# Patient Record
Sex: MALE | Race: WHITE | NOT HISPANIC OR LATINO | Employment: FULL TIME | URBAN - METROPOLITAN AREA
[De-identification: names, ages, dates, MRNs, and addresses within clinical notes are randomized per-mention and may not be internally consistent; named-entity substitution may affect disease eponyms.]

---

## 2024-04-29 ENCOUNTER — OFFICE VISIT (OUTPATIENT)
Dept: URGENT CARE | Facility: CLINIC | Age: 24
End: 2024-04-29
Payer: COMMERCIAL

## 2024-04-29 VITALS
RESPIRATION RATE: 16 BRPM | HEART RATE: 63 BPM | DIASTOLIC BLOOD PRESSURE: 80 MMHG | OXYGEN SATURATION: 100 % | HEIGHT: 75 IN | TEMPERATURE: 98.1 F | WEIGHT: 200 LBS | SYSTOLIC BLOOD PRESSURE: 122 MMHG | BODY MASS INDEX: 24.87 KG/M2

## 2024-04-29 DIAGNOSIS — L03.032 PARONYCHIA OF GREAT TOE OF LEFT FOOT: ICD-10-CM

## 2024-04-29 RX ORDER — CEPHALEXIN 500 MG/1
500 CAPSULE ORAL 4 TIMES DAILY
Qty: 28 CAPSULE | Refills: 0 | Status: SHIPPED | OUTPATIENT
Start: 2024-04-29

## 2024-04-29 ASSESSMENT — ENCOUNTER SYMPTOMS
CONSTITUTIONAL NEGATIVE: 1
GASTROINTESTINAL NEGATIVE: 1
NEUROLOGICAL NEGATIVE: 1
RESPIRATORY NEGATIVE: 1
CARDIOVASCULAR NEGATIVE: 1

## 2024-04-30 NOTE — PROGRESS NOTES
"Subjective     Ralph Ramsey is a 24 y.o. male who presents with Toe Injury (Cracked his left big toe nail, inflammed x 2 months)            HPI  Left great toenail injury 2 months ago.  It was cracked and bruised.  However last few weeks he has noticed swelling, redness, tenderness of the nailbed.  The nail itself is loose.  There is no bony tenderness or systemic symptoms.  OTC meds unhelpful.      PMH:  has no past medical history on file.  MEDS:   Current Outpatient Medications:     cephALEXin (KEFLEX) 500 MG Cap, Take 1 Capsule by mouth 4 times a day., Disp: 28 Capsule, Rfl: 0  ALLERGIES: Not on File  SURGHX: No past surgical history on file.  SOCHX:    FH: family history is not on file.        Review of Systems   Constitutional: Negative.    Respiratory: Negative.     Cardiovascular: Negative.    Gastrointestinal: Negative.    Skin:         Toenail injury with infection   Neurological: Negative.        Medications, Allergies, and current problem list reviewed today in Epic           Objective     /80   Pulse 63   Temp 36.7 °C (98.1 °F) (Temporal)   Resp 16   Ht 1.905 m (6' 3\")   Wt 90.7 kg (200 lb)   SpO2 100%   BMI 25.00 kg/m²      Physical Exam  Vitals and nursing note reviewed.   Constitutional:       General: He is not in acute distress.     Appearance: Normal appearance. He is well-developed. He is not diaphoretic.   HENT:      Head: Normocephalic.      Right Ear: Hearing and external ear normal.      Left Ear: Hearing and external ear normal.      Nose: Nose normal.      Mouth/Throat:      Mouth: Mucous membranes are moist.   Eyes:      General:         Right eye: No discharge.         Left eye: No discharge.      Conjunctiva/sclera: Conjunctivae normal.   Cardiovascular:      Rate and Rhythm: Normal rate and regular rhythm.   Pulmonary:      Effort: Pulmonary effort is normal. No respiratory distress.      Breath sounds: Normal breath sounds.   Musculoskeletal:      Cervical back: Normal " range of motion and neck supple.   Skin:     General: Skin is warm and dry.      Comments: Nailbed infection of the left great toe with tenderness, edema, erythema and warmth.  No open lesions or discharge.  No red streaking or bony tenderness.  The left great toenail is injured with bruising at the distal nail.  The nailbed is anchored in place but does appear loose   Neurological:      General: No focal deficit present.      Mental Status: He is alert and oriented to person, place, and time. Mental status is at baseline.   Psychiatric:         Mood and Affect: Mood normal.         Behavior: Behavior normal.         Thought Content: Thought content normal.         Judgment: Judgment normal.                             Assessment & Plan     This is a very pleasant 24-year-old male presenting with a left great toenail injury 2 months ago.  The nail is cracked and bruised.  However over the last few weeks it has developed redness, tenderness, swelling and warmth.  No bony tenderness or systemic symptoms.  Nail is attached at the base but does appear loose.  We will treat the infection with Keflex and warm soaks.  Referral to podiatry for more long-term care. OTC meds and conservative measures as discussed        1. Paronychia of great toe of left foot  cephALEXin (KEFLEX) 500 MG Cap    Referral to Podiatry          I personally reviewed prior external notes and test results pertinent to today's visit. Return to clinic or go to ED if symptoms worsen or persist. Red flag symptoms and indications for ED discussed at length. Patient/Parent/Guardian voices understanding.  AVS with post-visit instructions printed and provided or given verbally.  Follow-up with your primary care provider in 3-5 days. All side effects and potential interactions of prescribed medication discussed including allergic response, GI upset, tendon injury, rash, sedation, OCP effectiveness, etc.    Please note that this dictation was created using  voice recognition software. I have made every reasonable attempt to correct obvious errors, but I expect that there are errors of grammar and possibly content that I did not discover before finalizing the note.

## 2024-05-20 ENCOUNTER — APPOINTMENT (OUTPATIENT)
Dept: RHEUMATOLOGY | Facility: MEDICAL CENTER | Age: 24
End: 2024-05-20
Attending: STUDENT IN AN ORGANIZED HEALTH CARE EDUCATION/TRAINING PROGRAM
Payer: COMMERCIAL

## 2024-06-04 ENCOUNTER — OFFICE VISIT (OUTPATIENT)
Dept: RHEUMATOLOGY | Facility: MEDICAL CENTER | Age: 24
End: 2024-06-04
Attending: STUDENT IN AN ORGANIZED HEALTH CARE EDUCATION/TRAINING PROGRAM
Payer: COMMERCIAL

## 2024-06-04 ENCOUNTER — HOSPITAL ENCOUNTER (OUTPATIENT)
Dept: RADIOLOGY | Facility: MEDICAL CENTER | Age: 24
End: 2024-06-04
Attending: STUDENT IN AN ORGANIZED HEALTH CARE EDUCATION/TRAINING PROGRAM
Payer: COMMERCIAL

## 2024-06-04 VITALS
OXYGEN SATURATION: 97 % | HEART RATE: 95 BPM | WEIGHT: 198 LBS | TEMPERATURE: 98 F | BODY MASS INDEX: 24.62 KG/M2 | SYSTOLIC BLOOD PRESSURE: 114 MMHG | DIASTOLIC BLOOD PRESSURE: 58 MMHG | HEIGHT: 75 IN

## 2024-06-04 DIAGNOSIS — D84.821 IMMUNOCOMPROMISED STATE DUE TO DRUG THERAPY (HCC): ICD-10-CM

## 2024-06-04 DIAGNOSIS — M79.673 PAIN OF FOOT, UNSPECIFIED LATERALITY: ICD-10-CM

## 2024-06-04 DIAGNOSIS — M12.80 HLA-B27 POSITIVE ARTHROPATHY: ICD-10-CM

## 2024-06-04 DIAGNOSIS — Z79.899 IMMUNOCOMPROMISED STATE DUE TO DRUG THERAPY (HCC): ICD-10-CM

## 2024-06-04 DIAGNOSIS — M45.8 ANKYLOSING SPONDYLITIS OF SACRAL REGION (HCC): Primary | ICD-10-CM

## 2024-06-04 PROCEDURE — 3074F SYST BP LT 130 MM HG: CPT | Performed by: STUDENT IN AN ORGANIZED HEALTH CARE EDUCATION/TRAINING PROGRAM

## 2024-06-04 PROCEDURE — 99204 OFFICE O/P NEW MOD 45 MIN: CPT | Performed by: STUDENT IN AN ORGANIZED HEALTH CARE EDUCATION/TRAINING PROGRAM

## 2024-06-04 PROCEDURE — 3078F DIAST BP <80 MM HG: CPT | Performed by: STUDENT IN AN ORGANIZED HEALTH CARE EDUCATION/TRAINING PROGRAM

## 2024-06-04 PROCEDURE — 99212 OFFICE O/P EST SF 10 MIN: CPT | Performed by: STUDENT IN AN ORGANIZED HEALTH CARE EDUCATION/TRAINING PROGRAM

## 2024-06-04 PROCEDURE — 77077 JOINT SURVEY SINGLE VIEW: CPT

## 2024-06-04 RX ORDER — AMOXICILLIN AND CLAVULANATE POTASSIUM 875; 125 MG/1; MG/1
TABLET, FILM COATED ORAL
COMMUNITY
Start: 2024-05-25

## 2024-06-04 RX ORDER — ADALIMUMAB 40MG/0.4ML
40 KIT SUBCUTANEOUS
Qty: 2 EACH | Refills: 5 | Status: SHIPPED | OUTPATIENT
Start: 2024-06-04 | End: 2024-06-09 | Stop reason: CLARIF

## 2024-06-04 NOTE — PROGRESS NOTES
[facility] RHEUMATOLOGY  [address], [place] [address]  Phone: [phone] ? Fax: [phone]  [facility]/Health-Services/Rheumatology    NEW PATIENT VISIT NOTE      DATE OF SERVICE: 06/04/2024         Subjective     REFERRING PRACTITIONER:  [facility]  [id number]  [place] [address]    PATIENT IDENTIFICATION:  Rlaph Ramsey  [address]  [address]  [place] [address]    YOB: 2000    MEDICAL RECORD NUMBER: [id number]         CHIEF COMPLAINT:   Chief Complaint   Patient presents with    New Patient     HLA B27 positive spondyloarthritis       HISTORY OF PRESENT ILLNESS:  Ralph Ramsey is a 24 y.o. male with pertinent history notable for HLA-B27 positive ankylosing spondylitis with sacroiliitis well-managed on adalimumab, who presents to [facility] care.     Ankylosing spondylitis was diagnosed at [facility] at the age of 20 in 2020. Previously under the care of rheumatologist Dr. Maria L Anton, last seen 9/2023. Initial presenting symptoms include lower back pain in the SIJ area with radicular symptoms. His low back pain was so severe that he was limping at one point and getting out of bed was challenging. He had nocturnal low back pain and rest was certainly exacerbating. Morning stiffness in the low back lasted for 1 hour, improved with activity. Prior to onset of inflammatory low back symptoms, had right knee effusion that lasted for 7-8 months and left ankle swelling that persisted for ~ 2 months. NSAIDs and intra-articular steroid injection were minimally effective. These peripheral joint symptoms resolved spontaneously. He had great improvement in axial symptoms within a few weeks of adalimumab injection and has had sustained relieve. Reports perhaps 6-7 mild flares of low back symptoms since 2020 that would last 3-4 days. Last injected about 1 month ago and need refills. He does not feel that the medication wears off too quickly.  Denies alternating buttock pain, enthesopathy along the Achilles, plantar fascia or elsewhere, dactylitis, abdominal pain with hematochezia/chronic diarrhea (no history of colonoscopies), or psoriasis. Reported one episode of psoriasiform rash that occurred on the right hand (along the thumb and index finger), this lasted ~ 5-6 months with spontaneous resolution. He is unsure if it had to do with dry weather here in [place] but did have the rash when he was in [place]. He saw an Ophthalmologist last year with no diagnosis of inflammatory eye disease and sees Optometry regularly. Denies episodes of red painful photophobic eyes. He remains quite active (hikes) despite a busy job as an  for [employer]. There is a family history of AS and he thinks some of his siblings also have AS (perhaps positive HLA-B27).     Of note, he developed infected toenail of the left 1st big toe at the end of March which preceded cracked nail that was not growing appropriately. Does not recall any particular trauma. He went to  and was prescribed cephalexin which helped somewhat but symptoms worsen with redness and tenderness so on second round of antibiotics with Augmentin which has helped with pain and swelling (has 1 week left). He was supposed to schedule an appointment with podiatry for further evaluation but did not get to it.    He is a non-smoker and drinks alcohol occasionally (1-2 drinks on weekends). Denies illicit drug use.     Reports other aspects of symptoms and medical history as noted on the questionnaire below or scanned under media tab.    Pertinent treatments:   Ibuprofen, x 4 months ineffective   Meloxicam, x 4 months ineffective   Prednisone, minimally effective  Adalimumab 40 mg SC q 14 days: 2020- present  Pertinent positive labs: HLA B27  Pertinent negative labs: 9/2023; CBC with differential, 11/2022; QuantiFERON gold  Pertinent imaging:   Will request imaging from previous rheumatologist at [facility].    REVIEW OF SYSTEMS:  Except as noted in the history above, relevant review of systems with emphasis on autoimmune rheumatic diseases was otherwise negative.    ACTIVE PROBLEM LIST:  HLA B27 positive  Ankylosing spondylitis  Sacroiliitis     PAST MEDICAL HISTORY:  HLA B27 positive  Ankylosing spondylitis  Sacroiliitis     PAST SURGICAL HISTORY: No surgeries    MEDICATIONS:  Current Outpatient Medications   Medication Sig    amoxicillin-clavulanate (AUGMENTIN) 875-125 MG Tab     Adalimumab (HUMIRA, 2 PEN,) 40 MG/0.4ML Pen-injector Kit Inject 0.4 mL under the skin every 14 days.    cephALEXin (KEFLEX) 500 MG Cap Take 1 Capsule by mouth 4 times a day. (Patient not taking: Reported on 6/4/2024)       ALLERGIES:   No Known Allergies    IMMUNIZATIONS:   Immunization History   Administered Date(s) Administered    PFIZER GRAY CAP SARS-COV-2 VACCINATION (12+) 04/17/2022       SOCIAL HISTORY:   Social History     Socioeconomic History    Marital status: Single       FAMILY HISTORY: See above         Objective     Vital Signs: /58 (BP Location: Left arm, Patient Position: Sitting, BP Cuff Size: Adult)   Pulse 95   Temp 36.7 °C (98 °F) (Temporal)   Ht 1.905 m (6' 3\")   Wt 89.8 kg (198 lb)   SpO2 97% Body mass index is 24.75 kg/m².    General: Appears well and comfortable  Eyes: No scleral or conjunctival lesions  ENT: No apparent oral or nasal lesions  Head/Neck: No apparent scalp or neck lesions  Cardiovascular: Regular rate and rhythm; no pericardial rubs  Respiratory: Breathing quiet and unlabored; no rales or pleural rubs  Gastrointestinal: No apparent organomegaly or abdominal masses  Integumentary:  No psoriasis  Musculoskeletal:   Mild tenderness and redness at 1st left MTP joint (primarily surrounding the toenail) otherwise no significant joint tenderness, periarticular soft tissue swelling, warmth, erythema, overt synovitis or significant restriction in range of motion of other joints " examined  Neurologic: No focal sensory or motor deficits  Psychiatric: Mood and affect appropriate    LABORATORY RESULTS REVIEWED AND INTERPRETED BY ME: See above    RADIOLOGY RESULTS REVIEWED AND INTERPRETED BY ME: See above    All relevant laboratory and imaging results reported on this note were reviewed and interpreted by me.         Assessment & Plan     Ralph Ramsey is a 24 y.o. male with history as noted above whose presentation merits the following clinical impressions and recommendations:    1. Ankylosing spondylitis of sacral region (HCC)  2. HLA-B27 positive arthropathy  Diagnosed with ankylosing spondylitis based on inflammatory low back pain, positive HLA B27 and sacroiliitis on pelvis MRI in 2020 at [facility]. He has primarily axial disease well managed on adalimumab which he has tolerated well without unwanted side effects. No history of inflammatory eye disease, psoriasis, IBD, dactylitis, or enthesopathy. NSAIDs were minimally effective prior to initiation of biologic therapy. Last injected adalimumab about 1 month ago and need refills. Currently without signs of active disease. Will request records of imaging and laboratory studies from previous rheumatologist.   - Adalimumab (HUMIRA, 2 PEN,) 40 MG/0.4ML Pen-injector Kit; Inject 0.4 mL under the skin every 14 days.  Dispense: 2 Each; Refill: 5  - CBC WITH DIFFERENTIAL; Future  - Comp Metabolic Panel; Future  - Quantiferon Gold Plus TB (4 tube); Future  - HEP C VIRUS ANTIBODY; Future  - HEP B SURFACE ANTIGEN; Future    3. Immunocompromised state due to drug therapy (HCC)  Presently with no history, physical, or laboratory evidence to suggest significant adverse drug effects or opportunistic infections, but need routine monitoring per guidelines.    4. Pain of foot, unspecified laterality  Paronychia of left 1st MTP joint toenail, currently on antibiotic therapy with improvement in swelling and pain.   - DX-JOINT  SURVEY-FEET SINGLE VIEW; Future  - Referral to Podiatry  - Referral to establish with PCP      The above assessment and plan were discussed with the patient who acknowledged understanding of the plan.    FOLLOW-UP: Return in about 6 months (around 12/4/2024).         Thank you for the opportunity to participate in the care of Ralph John Roxy.    Annie Chambers D.O.  Rheumatologist

## 2024-06-05 ENCOUNTER — TELEPHONE (OUTPATIENT)
Dept: RHEUMATOLOGY | Facility: MEDICAL CENTER | Age: 24
End: 2024-06-05
Payer: COMMERCIAL

## 2024-06-05 NOTE — PATIENT INSTRUCTIONS
Thank you for visiting our clinic today.     Summary of your visit:    Labs at your earliest convenience.     Follow up in 6 months.       AFTER VISIT INSTRUCTIONS    Below are important information to help you navigate your healthcare needs and help us serve you safely and effectively:  If laboratory tests and/or imaging studies were ordered, remember to go get them done as instructed.  If new prescriptions and/or refills were sent, remember to go pick them up from your local pharmacy, or call the specialty pharmacy to request shipment.  Always take your prescription medications exactly as prescribed unless instructed otherwise.  Note that antirheumatic drugs and steroids are immunosuppressive which means they increase your risk of infections and have multiple potential adverse effects on various organ systems in your body, though most of them are uncommon.  It is important that you are up-to-date on age-appropriate immunizations, particularly shingles and bacterial/viral pneumonia vaccines, which you can request from me or your primary care provider.  Be sure to read the drug package inserts to learn about the potential side effects of your medications before you start taking them.  If you experience any significant drug side effects, stop taking the medication and notify me promptly, and depending on the severity of the side effects, consider going to an urgent care or emergency department for immediate attention.  If there are significant findings on your lab tests and imaging studies that warrant further action, I will notify you with explanations via [employer] or phone call, otherwise you can view them on [employer] and let me know if you have any questions.  Note that [employer] messages are typically read during office hours and may take 1-7 business days before a response depending on the urgency of the situation and how busy my clinic schedule is.  In general, [employer] messaging is for non-urgent matters that do  not require immediate attention, so for urgent matters that cannot wait, you are advised to go to an urgent care.  Lastly, you are granted [employer] access to my documentation of your visit and are encouraged to read my note which details my assessment and plan for your condition.  To learn more about your condition and rheumatic diseases evaluated and treated by rheumatologists, as well as gain access to many helpful resources about these diseases, visit our website at [email or web address].

## 2024-06-05 NOTE — TELEPHONE ENCOUNTER
I went to submit a prior authorization for Humira and one of the questions provides a list or preferred products. Would you like to switch to a preferred medication?    Please advise.    Thank you,  Elsa Marcano  RX Coordinator I [facility]  [phone]

## 2024-06-09 DIAGNOSIS — M12.80 HLA-B27 POSITIVE ARTHROPATHY: ICD-10-CM

## 2024-06-09 DIAGNOSIS — M45.8 ANKYLOSING SPONDYLITIS OF SACRAL REGION (HCC): ICD-10-CM

## 2024-06-09 RX ORDER — ADALIMUMAB-ADAZ 40 MG/.4ML
40 INJECTION, SOLUTION SUBCUTANEOUS
Qty: 0.8 ML | Refills: 5 | Status: SHIPPED | OUTPATIENT
Start: 2024-06-09

## 2024-06-11 ENCOUNTER — TELEPHONE (OUTPATIENT)
Dept: RHEUMATOLOGY | Facility: MEDICAL CENTER | Age: 24
End: 2024-06-11
Payer: COMMERCIAL

## 2024-06-11 NOTE — TELEPHONE ENCOUNTER
Prior Authorization for Hyrimoz 40MG/0.4ML auto-injectors  has been approved for a quantity of 0.8 , day supply 28    Prior Authorization reference number: [id number]  Insurance: [employer] [facility]  Effective dates: 06/11/2024 to 06/11/2025  Copay: $N/A      Is patient eligible to fill with [facility]? No, test claim rejected stating Insurance Lockout.    Next Steps:  Sent to [facility]

## 2024-06-11 NOTE — TELEPHONE ENCOUNTER
Prior Authorization for Hyrimoz 40MG/0.4ML auto-injectors (Quantity: 0.8ml, Days: 28) has been submitted via Cover My Meds: Key ([id number])    Insurance: Dee    Will follow up in 24-48 business hours.

## 2024-06-12 ENCOUNTER — PATIENT MESSAGE (OUTPATIENT)
Dept: RHEUMATOLOGY | Facility: MEDICAL CENTER | Age: 24
End: 2024-06-12
Payer: COMMERCIAL

## 2024-06-12 NOTE — PROGRESS NOTES
I spoke to Ralph regarding his symptoms and to update him on Hyrimoz status which have been approved. He started to notice AS flare with low back stiffness last week Thursday and was unable to bear weight. He thought he may have overexerted himself at work. He had some emergency prednisone on hand and has been taking 10 mg in the morning and 10 mg at night. Today is his 3rd day of prednisone which has helped as he is not so stiff or limping around. He did take ibuprofen initially which took the edge off but discontinued after he started prednisone. He has enough steroids for the rest of the week however, will reach out to our office if he needs more while he awaits Hyrimoz.     Annie Chambers D.O.

## 2024-09-29 ENCOUNTER — OFFICE VISIT (OUTPATIENT)
Dept: URGENT CARE | Facility: PHYSICIAN GROUP | Age: 24
End: 2024-09-29
Payer: COMMERCIAL

## 2024-09-29 VITALS
DIASTOLIC BLOOD PRESSURE: 70 MMHG | OXYGEN SATURATION: 98 % | HEIGHT: 75 IN | RESPIRATION RATE: 16 BRPM | SYSTOLIC BLOOD PRESSURE: 128 MMHG | WEIGHT: 197 LBS | BODY MASS INDEX: 24.49 KG/M2 | TEMPERATURE: 97.8 F | HEART RATE: 73 BPM

## 2024-09-29 DIAGNOSIS — N48.1 BALANITIS: ICD-10-CM

## 2024-09-29 RX ORDER — CLOTRIMAZOLE 1 %
CREAM (GRAM) TOPICAL
Qty: 28 G | Refills: 0 | Status: SHIPPED | OUTPATIENT
Start: 2024-09-29

## 2024-09-29 ASSESSMENT — ENCOUNTER SYMPTOMS
SHORTNESS OF BREATH: 0
SORE THROAT: 0
DIARRHEA: 0
HEADACHES: 0
VOMITING: 0
FEVER: 0
MYALGIAS: 0
NAUSEA: 0
CHILLS: 0
ABDOMINAL PAIN: 0
COUGH: 0

## 2024-09-29 NOTE — PROGRESS NOTES
"Subjective:   Ralph Ramsey is a 24 y.o. male who presents for Sexually Transmitted Diseases (STD testing spots on genitals X 1 mth)      Sexually Transmitted Diseases  This is a new problem. Episode onset: x1 month. The problem has been gradually worsening. Associated symptoms include a rash. Pertinent negatives include no abdominal pain, chest pain, chills, congestion, coughing, fever, headaches, myalgias, nausea, sore throat, urinary symptoms or vomiting. He has tried nothing for the symptoms.       Review of Systems   Constitutional:  Negative for chills, fever and malaise/fatigue.   HENT:  Negative for congestion, ear pain, hearing loss and sore throat.    Respiratory:  Negative for cough and shortness of breath.    Cardiovascular:  Negative for chest pain.   Gastrointestinal:  Negative for abdominal pain, diarrhea, nausea and vomiting.   Genitourinary:  Negative for dysuria.   Musculoskeletal:  Negative for myalgias.   Skin:  Positive for rash. Negative for itching.   Neurological:  Negative for headaches.       Medications, Allergies, and current problem list reviewed today in Epic.     Objective:     /70   Pulse 73   Temp 36.6 °C (97.8 °F) (Temporal)   Resp 16   Ht 1.905 m (6' 3\")   Wt 89.4 kg (197 lb)   SpO2 98%     Physical Exam  Vitals and nursing note reviewed.   Constitutional:       General: He is not in acute distress.     Appearance: Normal appearance. He is not ill-appearing.   HENT:      Head: Normocephalic and atraumatic.      Right Ear: Tympanic membrane normal.      Left Ear: Tympanic membrane normal.      Nose: Nose normal.      Mouth/Throat:      Mouth: Mucous membranes are moist.      Pharynx: Oropharynx is clear.   Eyes:      Conjunctiva/sclera: Conjunctivae normal.      Pupils: Pupils are equal, round, and reactive to light.   Cardiovascular:      Rate and Rhythm: Normal rate.      Heart sounds: Normal heart sounds.   Pulmonary:      Effort: Pulmonary effort is normal. "      Breath sounds: Normal breath sounds.   Abdominal:      General: Abdomen is flat.      Palpations: Abdomen is soft.   Genitourinary:     Penis: Uncircumcised. No discharge.           Comments: Noted rash to base of head of penis when foreskin was pulled back.  These do not appear vascular in nature and there is no apparent drainage.  There is no tenderness to area.  Skin:     General: Skin is warm and dry.      Capillary Refill: Capillary refill takes less than 2 seconds.   Neurological:      Mental Status: He is alert and oriented to person, place, and time.   Psychiatric:         Mood and Affect: Mood normal.         Behavior: Behavior normal.         Assessment/Plan:       1. Balanitis  clotrimazole (LOTRIMIN) 1 % Cream        After assessment rash noted on patient's penis does appear to be possible balanitis.  Patient is in a monogamous relationship and denies any exposure to STDs.  Patient reports that this has been present for over a month and has not changed in appearance.  Patient has no pain/itching/burning to area.  Patient denies any drainage from the area.  Patient denies any urinary symptoms.  Patient reports that him and his significant other did have full STD testing prior to their relationship which was all negative.  Patient did bring up concerns of possible HPV.  Patient did receive vaccination when he was a child for HPV so this is highly unlikely.  At this time patient was provided clotrimazole cream and instructed to use as prescribed.  If patient has any other concerns or if there is any changes to rash patient was urged to return to urgent care or emergency room for further management.    Differential diagnosis, natural history, and supportive care discussed. We also reviewed side effects of medication including allergic response, GI upset, tendon injury, rash, sedation etc. Patient and/or guardian voices understanding.      Advised the patient to follow-up with the primary care physician  for recheck, reevaluation, and consideration of further management.    I personally reviewed prior external notes and test results pertinent to today's visit as well as additional imaging and testing completed in clinic today.     Please note that this dictation was created using voice recognition software. I have made every reasonable attempt to correct obvious errors, but I expect that there are errors of grammar and possibly content that I did not discover before finalizing the note.    This note was electronically signed by MIR Mills

## 2024-11-11 ENCOUNTER — APPOINTMENT (OUTPATIENT)
Dept: MEDICAL GROUP | Facility: IMAGING CENTER | Age: 24
End: 2024-11-11
Attending: STUDENT IN AN ORGANIZED HEALTH CARE EDUCATION/TRAINING PROGRAM
Payer: COMMERCIAL

## 2024-11-18 DIAGNOSIS — M12.80 HLA-B27 POSITIVE ARTHROPATHY: ICD-10-CM

## 2024-11-18 DIAGNOSIS — M45.8 ANKYLOSING SPONDYLITIS OF SACRAL REGION (HCC): ICD-10-CM

## 2024-11-20 DIAGNOSIS — M45.8 ANKYLOSING SPONDYLITIS OF SACRAL REGION (HCC): ICD-10-CM

## 2024-11-20 DIAGNOSIS — M12.80 HLA-B27 POSITIVE ARTHROPATHY: ICD-10-CM

## 2024-11-20 RX ORDER — ADALIMUMAB-ADAZ 40 MG/.4ML
1 INJECTION, SOLUTION SUBCUTANEOUS
Refills: 5 | OUTPATIENT
Start: 2024-11-20

## 2024-11-20 RX ORDER — ADALIMUMAB-ADAZ 40 MG/.4ML
40 INJECTION, SOLUTION SUBCUTANEOUS
Qty: 0.8 ML | Refills: 2 | Status: SHIPPED | OUTPATIENT
Start: 2024-11-20

## 2024-11-20 NOTE — PROGRESS NOTES
Prescription refill for Hyrimoz received. Chart reviewed. No labs in recent months.  Patient was previously seen in June 2024 at which time he was instructed to have labs drawn. Will provide limited refill and patient instructed to have labs drawn prior to his next appt 12/16/2024.

## 2025-02-07 ENCOUNTER — OFFICE VISIT (OUTPATIENT)
Dept: URGENT CARE | Facility: CLINIC | Age: 25
End: 2025-02-07
Payer: COMMERCIAL

## 2025-02-07 VITALS
BODY MASS INDEX: 27.21 KG/M2 | DIASTOLIC BLOOD PRESSURE: 82 MMHG | WEIGHT: 212 LBS | HEIGHT: 74 IN | OXYGEN SATURATION: 99 % | SYSTOLIC BLOOD PRESSURE: 120 MMHG | TEMPERATURE: 97.6 F | RESPIRATION RATE: 16 BRPM | HEART RATE: 64 BPM

## 2025-02-07 DIAGNOSIS — L60.0 INGROWN TOENAIL OF LEFT FOOT WITH INFECTION: ICD-10-CM

## 2025-02-07 PROCEDURE — 3074F SYST BP LT 130 MM HG: CPT | Performed by: NURSE PRACTITIONER

## 2025-02-07 PROCEDURE — 3079F DIAST BP 80-89 MM HG: CPT | Performed by: NURSE PRACTITIONER

## 2025-02-07 PROCEDURE — 99213 OFFICE O/P EST LOW 20 MIN: CPT | Performed by: NURSE PRACTITIONER

## 2025-02-07 RX ORDER — AMOXICILLIN AND CLAVULANATE POTASSIUM 500; 125 MG/1; MG/1
TABLET, FILM COATED ORAL
COMMUNITY
Start: 2024-12-03 | End: 2025-02-07

## 2025-02-07 RX ORDER — CEPHALEXIN 500 MG/1
500 CAPSULE ORAL 4 TIMES DAILY
Qty: 28 CAPSULE | Refills: 0 | Status: SHIPPED | OUTPATIENT
Start: 2025-02-07 | End: 2025-02-14

## 2025-02-08 NOTE — PROGRESS NOTES
Verbal consent was acquired by the patient to use [employer] ambient listening note generation during this visit          Chief Complaint   Patient presents with    Ingrown Toenail     Ingrown toenail on left big toe  Previously had nail on both big toe pulled off          History of Present Illness  The patient is a 25-year-old male who presents for evaluation of an ingrown toenail.    He reports the onset of pain in his toenail this morning, describing it as a sensation of the nail digging into the skin. He also observed signs of infection and inflammation. He recalls a previous incident where the nail was removed due to damage to the nail bed, which he suspects may have led to the current inward growth of the nail. Despite this, he notes that the nail had been growing normally until recently. He mentions noticing some drainage from the affected area last night. He expresses uncertainty about the future growth of the nail, as this is the first time it has regrown since the initial damage. He has had both big toenails removed in the past due to similar issues.         ROS:    No severe shortness of breath   No cardiac like chest pain, as discussed   As otherwise stated in HPI    Medical/SX/ Social History:  Reviewed per chart    Pertinent Medications:    Current Outpatient Medications on File Prior to Visit   Medication Sig Dispense Refill    Adalimumab-adaz (HYRIMOZ) 40 MG/0.4ML Solution Auto-injector Inject 40 mg under the skin every 14 days. 0.8 mL 2    clotrimazole (LOTRIMIN) 1 % Cream 1 application twice daily until clinical resolution, typically 1 to 4 weeks (Patient not taking: Reported on 2/7/2025) 28 g 0     No current facility-administered medications on file prior to visit.        Allergies:    Patient has no known allergies.     Problem list, medications, and allergies reviewed by myself today in Epic     Physical Exam:    Vitals:    02/07/25 1022   BP: 120/82   Pulse: 64   Resp: 16   Temp: 36.4 °C  (97.6 °F)   SpO2: 99%             Physical Exam  Vitals and nursing note reviewed.   Constitutional:       General: He is not in acute distress.     Appearance: Normal appearance. He is not ill-appearing or toxic-appearing.   HENT:      Head: Normocephalic and atraumatic.      Nose: Nose normal.      Mouth/Throat:      Mouth: Mucous membranes are moist.      Pharynx: Oropharynx is clear.   Eyes:      Extraocular Movements: Extraocular movements intact.      Conjunctiva/sclera: Conjunctivae normal.      Pupils: Pupils are equal, round, and reactive to light.   Cardiovascular:      Rate and Rhythm: Normal rate.      Pulses: Normal pulses.   Pulmonary:      Effort: Pulmonary effort is normal.   Musculoskeletal:         General: Normal range of motion.      Cervical back: Normal range of motion.        Feet:    Feet:      Comments: There is erythema, edema and warmth to the distal tip of the left great toe.  The distal tip of the great toenail is ingrown. There is pain to palpation.  There is an area of fluctuance at the ingrown area.  This was opened with a #18 gauge needle and mucopurulent drainage was expressed.    Skin:     General: Skin is warm.      Capillary Refill: Capillary refill takes less than 2 seconds.   Neurological:      General: No focal deficit present.      Mental Status: He is alert and oriented to person, place, and time.        Medical Decision making and plan :  I personally reviewed prior external notes and test results pertinent to today's visit. Pt is clinically stable at today's acute urgent care visit.  Patient appears nontoxic with no acute distress noted. Appropriate for outpatient care at this time.    Pleasant 25 y.o. male presented clinic with:     Assessment & Plan  1. Ingrown toenail with infection, left great toe.   The patient reports pain and inflammation in the toenail, which started this morning. There is evidence of infection and a fluid pocket. A needle was used to drain the  fluid, which provided some relief. A Band-Aid was applied to the area. He is advised to soak the toe in warm water several times daily to keep the toenail soft during its growth phase. A prescription for Keflex 500 mg, to be taken four times daily for one week, has been sent to [facility] on [address] If symptoms persist, a referral to podiatry will be considered for potential toenail resection.    PROCEDURE  The patient had both big toenails removed in the past due to similar issues.    Today, a needle was used to drain the fluid from the ingrown toenail, providing some relief. A Band-Aid was applied to the area.      Shared decision-making was utilized with patient for treatment plan. Medication discussed included indication for use and the potential benefits and side effects. Education was provided regarding the aforementioned assessments.  Differential Diagnosis, natural history, and supportive care discussed. All of the patient's questions were answered to their satisfaction at the time of discharge. Patient was encouraged to monitor symptoms closely. Those signs and symptoms which would warrant concern and mandate seeking a higher level of service through the emergency department discussed at length.  Patient stated agreement and understanding of this plan of care.    Disposition:  Home in stable condition     Voice Recognition Disclaimer:  Portions of this document were created using voice recognition software and [employer] technology provided by [employer]. The software does have a chance of producing errors of grammar and possibly content. I have made every reasonable attempt to correct obvious errors, but there may be errors of grammar and possibly content that I did not discover before finalizing the  documentation.    ELENA El.

## 2025-03-22 DIAGNOSIS — M12.80 HLA-B27 POSITIVE ARTHROPATHY: ICD-10-CM

## 2025-03-22 DIAGNOSIS — M45.8 ANKYLOSING SPONDYLITIS OF SACRAL REGION (HCC): ICD-10-CM

## 2025-03-22 RX ORDER — ADALIMUMAB-ADAZ 40 MG/.4ML
40 INJECTION, SOLUTION SUBCUTANEOUS
Qty: 0.8 ML | Refills: 3 | Status: SHIPPED | OUTPATIENT
Start: 2025-03-22